# Patient Record
Sex: FEMALE | Race: WHITE | Employment: UNEMPLOYED | URBAN - METROPOLITAN AREA
[De-identification: names, ages, dates, MRNs, and addresses within clinical notes are randomized per-mention and may not be internally consistent; named-entity substitution may affect disease eponyms.]

---

## 2020-12-04 ENCOUNTER — VIRTUAL VISIT (OUTPATIENT)
Dept: FAMILY MEDICINE | Facility: OTHER | Age: 30
End: 2020-12-04

## 2020-12-04 DIAGNOSIS — Z20.822 SUSPECTED COVID-19 VIRUS INFECTION: Primary | ICD-10-CM

## 2020-12-04 DIAGNOSIS — Z20.822 SUSPECTED COVID-19 VIRUS INFECTION: ICD-10-CM

## 2020-12-04 PROCEDURE — U0003 INFECTIOUS AGENT DETECTION BY NUCLEIC ACID (DNA OR RNA); SEVERE ACUTE RESPIRATORY SYNDROME CORONAVIRUS 2 (SARS-COV-2) (CORONAVIRUS DISEASE [COVID-19]), AMPLIFIED PROBE TECHNIQUE, MAKING USE OF HIGH THROUGHPUT TECHNOLOGIES AS DESCRIBED BY CMS-2020-01-R: HCPCS | Performed by: NURSE PRACTITIONER

## 2020-12-04 NOTE — PROGRESS NOTES
"Date: 2020 09:09:43  Clinician: Mercedes Salas  Clinician NPI: [id number]  Patient: Annie Kidd  Patient : 1990  Patient Address: [address] [place], [place] [id number]  Patient Phone: [phone]  Visit Protocol: URI  Patient Summary:  Annie is a 30 year old ( : 1990 ) female who initiated a [facility] Visit for COVID-19 (Coronavirus) evaluation and screening. When asked the question \"Please sign me up to receive news, health information and promotions. \", Annie responded \"No\".    Annie states her symptoms started 1-2 days ago.   Her symptoms consist of a headache, a cough, nasal congestion, rhinitis, myalgia, malaise, and a sore throat.   Symptom details     Nasal secretions: The color of her mucus is clear.    Cough: Annie coughs a few times an hour and her cough is not more bothersome at night. Phlegm does not come into her throat when she coughs. She does not believe her cough is caused by post-nasal drip.     Sore throat: Annie reports having moderate throat pain (4-6 on a 10 point pain scale), does not have exudate on her tonsils, and can swallow liquids. The lymph nodes in her neck are not enlarged. A rash has not appeared on the skin since the sore throat started.     Headache: She states the headache is severe (7-9 on a 10 point pain scale).      Annie denies having ear pain, wheezing, fever, enlarged lymph nodes, nausea, vomiting, facial pain or pressure, chills, teeth pain, ageusia, diarrhea, and anosmia. She also denies taking antibiotic medication in the past month and having recent facial or sinus surgery in the past 60 days. She is not experiencing dyspnea.   Precipitating events  Within the past week, Annie has not been exposed to someone with strep throat. She has not recently been exposed to someone with influenza. Annie has been in close contact with the following high risk individuals: children under the age of 5 and people with asthma, heart disease " or diabetes.   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 In the past 14 days, Annie has worked or volunteered at a long term care facility. Additional job details as reported by the patient (free text): [employer] at [employer]   In the past 14 days, she has not lived in a congregate living setting.   Annie has had a close contact with a laboratory-confirmed COVID-19 patient within 14 days of symptom onset. She was exposed at her work. She does not know when she was exposed to the laboratory-confirmed COVID-19 patient.   Additional information about contact with COVID-19 (Coronavirus) patient as reported by the patient (free text): Resident had confirmed case of COVID and I helped provide cares in the same room on third floor of [facility]    Since December 2019, Annie has not been tested for COVID-19 and has not had upper respiratory infection or influenza-like illness.   Pertinent medical history  Annie typically gets a yeast infection when she takes antibiotics. She has used fluconazole (Diflucan) to treat previous yeast infections. 1 dose of fluconazole (Diflucan) has typically been sufficient for symptoms to resolve in the past.   She has not been told by her provider to avoid NSAIDs.   Annie does not have diabetes. She denies having immunosuppressive conditions (e.g., chemotherapy, HIV, organ transplant, long-term use of steroids or other immunosuppressive medications, splenectomy). She does not have severe COPD and congestive heart failure. She does not have asthma.   Annie needs a return to work/school note.   Weight: 118 lbs   Annie does not smoke or use smokeless tobacco.   She denies pregnancy and denies breastfeeding. She has menstruated in the past month.   Additional information as reported by the patient (free text): Was also potentially exposed to a patient outside of work,  through child's    Weight: 118 lbs    MEDICATIONS: fluoxetine oral, ALLERGIES: NKDA  Clinician Response:  Dear Annie,   Your symptoms show that you may have coronavirus (COVID-19). This illness can cause fever, cough and trouble breathing. Many people get a mild case and get better on their own. Some people can get very sick.  What should I do?  We would like to test you for this virus.   1. Please call [phone] to schedule your visit. Explain that you were referred by [facility] to have a COVID-19 test. Be ready to share your [facility] visit ID number.  * If you need to schedule in [facility] please call [phone] or for [employer] employees please call [phone].  * If you need to schedule in the [place] area please call [phone]. [employer] employees call [phone].  The following will serve as your written order for this COVID Test, ordered by me, for the indication of suspected COVID [Z20.828]: The test will be ordered in [facility], our electronic health record, after you are scheduled. It will show as ordered and authorized by Amor Houser MD.  Order: COVID-19 (Coronavirus) PCR for SYMPTOMATIC testing from [facility].   2. When it's time for your COVID test:  Stay at least 6 feet away from others. (If someone will drive you to your test, stay in the backseat, as far away from the  as you can.)   Cover your mouth and nose with a mask, tissue or washcloth.  Go straight to the testing site. Don't make any stops on the way there or back.      3.Starting now: Stay home and away from others (self-isolate) until:   You've had no fever---and no medicine that reduces fever---for one full day (24 hours). And...   Your other symptoms have gotten better. For example, your cough or breathing has improved. And...   At least 10 days have passed since your symptoms started.       During this time, don't leave the house except for testing or medical care.   Stay in your own room, even for meals. Use your own  "bathroom if you can.   Stay away from others in your home. No hugging, kissing or shaking hands. No visitors.  Don't go to work, school or anywhere else.    Clean \"high touch\" surfaces often (doorknobs, counters, handles, etc.). Use a household cleaning spray or wipes. You'll find a full list of  on the EPA website: www.epa.gov/pesticide-registration/list-n-disinfectants-use-against-sars-cov-2.   Cover your mouth and nose with a mask, tissue or washcloth to avoid spreading germs.  Wash your hands and face often. Use soap and water.  Caregivers in these groups are at risk for severe illness due to COVID-19:  o People 65 years and older  o People who live in a nursing home or long-term care facility  o People with chronic disease (lung, heart, cancer, diabetes, kidney, liver, immunologic)  o People who have a weakened immune system, including those who:   Are in cancer treatment  Take medicine that weakens the immune system, such as corticosteroids  Had a bone marrow or organ transplant  Have an immune deficiency  Have poorly controlled HIV or AIDS  Are obese (body mass index of 40 or higher)  Smoke regularly   o Caregivers should wear gloves while washing dishes, handling laundry and cleaning bedrooms and bathrooms.  o Use caution when washing and drying laundry: Don't shake dirty laundry, and use the warmest water setting that you can.  o For more tips, go to www.cdc.gov/coronavirus/2019-ncov/downloads/10Things.pdf.    4.Sign up for [employer]. We know it's scary to hear that you might have COVID-19. We want to track your symptoms to make sure you're okay over the next 2 weeks. Please look for an email from [employer]---this is a free, online program that we'll use to keep in touch. To sign up, follow the link in the email. Learn more at [email or web address]  How can I take care of myself?   Get lots of rest. Drink extra fluids (unless a doctor has told you not to).   Take Tylenol " (acetaminophen) for fever or pain. If you have liver or kidney problems, ask your family doctor if it's okay to take Tylenol.   Adults can take either:    650 mg (two 325 mg pills) every 4 to 6 hours, or...   1,000 mg (two 500 mg pills) every 8 hours as needed.    Note: Don't take more than 3,000 mg in one day. Acetaminophen is found in many medicines (both prescribed and over-the-counter medicines). Read all labels to be sure you don't take too much.   For children, check the Tylenol bottle for the right dose. The dose is based on the child's age or weight.    If you have other health problems (like cancer, heart failure, an organ transplant or severe kidney disease): Call your specialty clinic if you don't feel better in the next 2 days.       Know when to call 911. Emergency warning signs include:    Trouble breathing or shortness of breath Pain or pressure in the chest that doesn't go away Feeling confused like you haven't felt before, or not being able to wake up Bluish-colored lips or face.  Where can I get more information?   [facility] -- About COVID-19: [email or web address]   CDC -- What to Do If You're Sick: www.cdc.gov/coronavirus/2019-ncov/about/steps-when-sick.html   CDC -- Ending Home Isolation: www.cdc.gov/coronavirus/2019-ncov/hcp/disposition-in-home-patients.html   CDC -- Caring for Someone: www.cdc.gov/coronavirus/2019-ncov/if-you-are-sick/care-for-someone.html   [facility] -- Interim Guidance for Hospital Discharge to Home: www.health.[facility] to [facility].[place]/diseases/coronavirus/hcp/hospdischarge.pdf   [facility] clinical trials (COVID-19 research studies): clinicalaffairs.[facility]/umn-clinical-trials    Below are the COVID-19 hotlines at the [place] Department of Health ([facility]). Interpreters are available.    For health questions: Call 041-209-2265 or 1-966.226.1580 (7 a.m. to 7 p.m.) For questions about schools and childcare: Call 876-168-0129 or 1-609.948.1385 (7 a.m. to 7 p.m.)     Diagnosis: Contact with and (suspected) exposure to other viral communicable diseases  Diagnosis ICD: Z20.828  Addendum created: December 04 09:14:15, 2020 created by: Mercedes Salas NP body: Work note should read No fever for 24 hours without the use of fever reducing medications, AND improvement of symptoms, AND has been 10 days since symptom onset.

## 2020-12-05 LAB
SARS-COV-2 RNA SPEC QL NAA+PROBE: NOT DETECTED
SPECIMEN SOURCE: NORMAL